# Patient Record
Sex: FEMALE | Race: WHITE | NOT HISPANIC OR LATINO | URBAN - METROPOLITAN AREA
[De-identification: names, ages, dates, MRNs, and addresses within clinical notes are randomized per-mention and may not be internally consistent; named-entity substitution may affect disease eponyms.]

---

## 2020-09-28 ENCOUNTER — TRANSCRIBE ORDERS (OUTPATIENT)
Dept: PHYSICAL THERAPY | Facility: HOSPITAL | Age: 75
End: 2020-09-28

## 2020-09-28 DIAGNOSIS — R42 DIZZINESS: Primary | ICD-10-CM

## 2020-10-15 ENCOUNTER — HOSPITAL ENCOUNTER (OUTPATIENT)
Dept: PHYSICAL THERAPY | Facility: HOSPITAL | Age: 75
Setting detail: THERAPIES SERIES
Discharge: HOME | End: 2020-10-15

## 2020-10-15 DIAGNOSIS — H83.2X2 VESTIBULAR DISEQUILIBRIUM INVOLVING LEFT INNER EAR: Primary | ICD-10-CM

## 2020-10-15 PROCEDURE — 97162 PT EVAL MOD COMPLEX 30 MIN: CPT | Performed by: PHYSICAL THERAPIST

## 2020-10-15 PROCEDURE — 97112 NEUROMUSCULAR REEDUCATION: CPT | Performed by: PHYSICAL THERAPIST

## 2020-10-15 NOTE — THERAPY DISCHARGE NOTE
Outpatient Physical Therapy Vestibular Initial Evaluation/Discharge  [facility]     Patient Name: Adela Stanford  : 1945  MRN: [id number]  Today's Date: 10/15/2020      Visit Date: 10/15/2020    There is no problem list on file for this patient.       No past medical history on file.     No past surgical history on file.      Visit Dx:     ICD-10-CM ICD-9-CM   1. Vestibular disequilibrium involving left inner ear  H83.2X2 386.50       Patient History     Row Name 10/15/20 1400             History    Chief Complaint  Dizziness  -GR      Date Current Problem(s) Began  10/15/20  -GR      Brief Description of Current Complaint  6 months of dizziness worse when wearing a cochlear implant.  She feels off balance and is worse with her cochlear implant on.  Her  think sshe has been worse [place].  She did have a VNG.  -GR      Patient/Caregiver Goals  Relief from dizziness  -GR      What clinical tests have you had for this problem?  Other 1 (comment) VNG  -GR         Pain     Pain Location  -- no pain associated.  -GR         Fall Risk Assessment    Any falls in the past year:  Yes  -GR      Number of falls reported in the last 12 months  1  -GR      Factors that contributed to the fall:  Tripped trying to keep up with  in the airport  -GR         Services    Do you plan to receive Home Health services in the near future  No  -GR         Daily Activities    Primary Language  English  -GR      Action taken if English not primary language  ASL  -GR      How does patient learn best?  Reading;Demonstration;Pictures/Video  -GR      Pt Participated in POC and Goals  Yes  -GR         Safety    Are you being hurt, hit, or frightened by anyone at home or in your life?  No  -GR      Are you being neglected by a caregiver  No  -GR        User Key  (r) = Recorded By, (t) = Taken By, (c) = Cosigned By    Initials Name Provider Type    GR Trino Tate, PT Physical Therapist           Vestibular Eval     Row Name 10/15/20 1600             Occulomotor Exam Fixation Present    Occular ROM  Normal  -GR      Spontaneous Nystagmus  Absent  -GR      Gaze-induced Nystagmus  Absent  -GR      Smooth Pursuit  Symptom Provoking  -GR         Vestibulo-Occular Reflex (VOR)    VOR to Fast Head Movement/Head Thrust Test  Other difficulty fixating; asymptomatic  -GR      VOR Cancellation  Corrective saccades  -GR         Positional Testing    Positional Testing  Without infrared goggles  -GR      Vertebrobasilar Artery Screen - Right  Negative  -GR      Vertebrobasilar Artery Screen - Left  Negative  -GR      Flushing-Hallpike Right  No nystagmus  -GR      Maryjo-Hallpike Left  No nystagmus  -GR      Horizontal Roll Test Right  No nystagmus  -GR      Horizontal Roll Test Left  No nystagmus  -GR        User Key  (r) = Recorded By, (t) = Taken By, (c) = Cosigned By    Initials Name Provider Type    Trino Ulloa, PT Physical Therapist                      Therapy Education  Education Details: HEP, exam findings, trial therapy in [place]      OP Exercises     Row Name 10/15/20 1600             Total Minutes    33037 -  PT Neuromuscular Reeducation Minutes  8  -GR         Exercise 1    Exercise Name 1  VORx1 seated  -GR      Cueing 1  Demo  -GR      Sets 1  1  -GR      Reps 1  2  -GR      Time 1  30 seconds  -GR      Additional Comments  yaw and pitch  -GR         Exercise 2    Exercise Name 2  VOR cancellation seated  -GR      Cueing 2  Demo  -GR      Sets 2  1  -GR      Reps 2  1  -GR      Time 2  30 seconds  -GR        User Key  (r) = Recorded By, (t) = Taken By, (c) = Cosigned By    Initials Name Provider Type    Trino Ulloa, PT Physical Therapist                      PT OP Goals     Row Name 10/15/20 1600          PT Short Term Goals    STG Date to Achieve  10/15/20  -GR     STG 1  Patient to be independent with initial HEP.  -GR     STG 1 Progress  Met  -GR       User Key  (r) = Recorded By, (t) =  Taken By, (c) = Cosigned By    Initials Name Provider Type    Trino Ulloa, PT Physical Therapist          PT Assessment/Plan     Row Name 10/15/20 1600          PT Assessment    Functional Limitations  Impaired gait;Limitations in community activities;Limitations in functional capacity and performance  -GR     Impairments  Balance  -GR     Assessment Comments  76 y/o deaf F accompanied by  for vestibular evaluation.  BPPV screen is negative; she does have abnormal VOR cancellation and symptomatic smooth pursuit. PMH significant for R total hearing loss, asymmetric VNG findiings, cochlear implant, 1 fall.  She would benefit from vestibular retraining for her disequilibrium and also needs reevaluation of her current cochlear implant per her continued complaints. This patient is returning to [place] this weekend and should consider continuing her PT there if able.  -GR     Please refer to paper survey for additional self-reported information  Yes  -GR     Rehab Potential  Fair  -GR     Patient/caregiver participated in establishment of treatment plan and goals  Yes  -GR     Patient would benefit from skilled therapy intervention  Yes  -GR        PT Plan    PT Frequency  One time visit  -GR     PT Plan Comments  dc to HEP and vestibular PT in [place].  -GR       User Key  (r) = Recorded By, (t) = Taken By, (c) = Cosigned By    Initials Name Provider Type    Trino Ulloa, PT Physical Therapist           Outcome Measure Options: Dizziness Handicap Inventory(66/100)         Time Calculation:   Start Time: 1415  Stop Time: 1500  Time Calculation (min): 45 min  Total Timed Code Minutes- PT: 8 minute(s)   Therapy Charges for Today     Code Description Service Date Service Provider Modifiers Qty    30587410666 HC PT NEUROMUSC RE EDUCATION EA 15 MIN 10/15/2020 Trino Tate, PT GP 1    67040541015 HC PT EVAL MOD COMPLEXITY 2 10/15/2020 Trino Tate, PT GP 1          PT G-Codes  Outcome Measure Options:  Dizziness Handicap Inventory(66/100)         Trino Tate, PT  10/15/2020